# Patient Record
Sex: MALE | Race: WHITE | NOT HISPANIC OR LATINO | Employment: FULL TIME | URBAN - METROPOLITAN AREA
[De-identification: names, ages, dates, MRNs, and addresses within clinical notes are randomized per-mention and may not be internally consistent; named-entity substitution may affect disease eponyms.]

---

## 2022-10-07 ENCOUNTER — TELEPHONE (OUTPATIENT)
Dept: SCHEDULING | Facility: IMAGING CENTER | Age: 42
End: 2022-10-07

## 2022-10-14 ENCOUNTER — HOSPITAL ENCOUNTER (OUTPATIENT)
Dept: RADIOLOGY | Facility: MEDICAL CENTER | Age: 42
End: 2022-10-14
Attending: NURSE PRACTITIONER
Payer: COMMERCIAL

## 2022-10-14 ENCOUNTER — OFFICE VISIT (OUTPATIENT)
Dept: MEDICAL GROUP | Facility: MEDICAL CENTER | Age: 42
End: 2022-10-14
Payer: COMMERCIAL

## 2022-10-14 VITALS
WEIGHT: 152 LBS | HEIGHT: 72 IN | BODY MASS INDEX: 20.59 KG/M2 | TEMPERATURE: 96.9 F | OXYGEN SATURATION: 98 % | DIASTOLIC BLOOD PRESSURE: 60 MMHG | SYSTOLIC BLOOD PRESSURE: 100 MMHG | HEART RATE: 80 BPM

## 2022-10-14 DIAGNOSIS — S22.42XD CLOSED FRACTURE OF MULTIPLE RIBS OF LEFT SIDE WITH ROUTINE HEALING, SUBSEQUENT ENCOUNTER: ICD-10-CM

## 2022-10-14 DIAGNOSIS — R79.89 ELEVATED LFTS: ICD-10-CM

## 2022-10-14 DIAGNOSIS — D62 ACUTE BLOOD LOSS ANEMIA: ICD-10-CM

## 2022-10-14 DIAGNOSIS — Z76.89 ENCOUNTER TO ESTABLISH CARE: ICD-10-CM

## 2022-10-14 DIAGNOSIS — Z13.6 SCREENING FOR CARDIOVASCULAR CONDITION: ICD-10-CM

## 2022-10-14 DIAGNOSIS — F17.210 CIGARETTE NICOTINE DEPENDENCE WITHOUT COMPLICATION: ICD-10-CM

## 2022-10-14 DIAGNOSIS — Z13.29 SCREENING FOR THYROID DISORDER: ICD-10-CM

## 2022-10-14 DIAGNOSIS — S42.012D CLOSED ANTERIOR DISPLACED FRACTURE OF STERNAL END OF LEFT CLAVICLE WITH ROUTINE HEALING, SUBSEQUENT ENCOUNTER: ICD-10-CM

## 2022-10-14 DIAGNOSIS — Z87.828 HISTORY OF TRAUMA: ICD-10-CM

## 2022-10-14 PROBLEM — S32.009D CLOSED FRACTURE OF TRANSVERSE PROCESS OF LUMBAR VERTEBRA WITH ROUTINE HEALING: Status: ACTIVE | Noted: 2022-09-25

## 2022-10-14 PROBLEM — S36.039A SPLENIC LACERATION: Status: ACTIVE | Noted: 2022-09-23

## 2022-10-14 PROBLEM — S42.002A FRACTURE OF UNSPECIFIED PART OF LEFT CLAVICLE, INITIAL ENCOUNTER FOR CLOSED FRACTURE: Status: ACTIVE | Noted: 2022-09-25

## 2022-10-14 PROBLEM — J94.2 HEMOPNEUMOTHORAX ON LEFT: Status: ACTIVE | Noted: 2022-09-25

## 2022-10-14 PROBLEM — S22.42XA FRACTURE OF MULTIPLE RIBS OF LEFT SIDE: Status: ACTIVE | Noted: 2022-09-25

## 2022-10-14 PROBLEM — S22.008D CLOSED FRACTURE OF SPINOUS PROCESS OF THORACIC VERTEBRA WITH ROUTINE HEALING: Status: ACTIVE | Noted: 2022-09-25

## 2022-10-14 PROBLEM — S12.600D CLOSED DISPLACED FRACTURE OF SEVENTH CERVICAL VERTEBRA WITH ROUTINE HEALING: Status: ACTIVE | Noted: 2022-09-25

## 2022-10-14 PROBLEM — S37.032A KIDNEY LACERATION, LEFT: Status: ACTIVE | Noted: 2022-09-25

## 2022-10-14 PROCEDURE — 71046 X-RAY EXAM CHEST 2 VIEWS: CPT

## 2022-10-14 PROCEDURE — 99204 OFFICE O/P NEW MOD 45 MIN: CPT | Performed by: NURSE PRACTITIONER

## 2022-10-14 RX ORDER — METHOCARBAMOL 500 MG/1
TABLET, FILM COATED ORAL
COMMUNITY
Start: 2022-09-26 | End: 2022-10-14

## 2022-10-14 RX ORDER — CYCLOBENZAPRINE HCL 5 MG
5 TABLET ORAL 3 TIMES DAILY PRN
Qty: 90 TABLET | Refills: 0 | Status: SHIPPED | OUTPATIENT
Start: 2022-10-14

## 2022-10-14 RX ORDER — NALOXONE HYDROCHLORIDE 4 MG/.1ML
SPRAY NASAL
COMMUNITY
Start: 2022-09-26 | End: 2022-10-14

## 2022-10-14 RX ORDER — OXYCODONE HYDROCHLORIDE 5 MG/1
TABLET ORAL
COMMUNITY
Start: 2022-09-26 | End: 2022-10-14

## 2022-10-14 RX ORDER — NALOXONE HYDROCHLORIDE 4 MG/.1ML
1 SPRAY NASAL
COMMUNITY
Start: 2022-09-26 | End: 2022-10-14

## 2022-10-14 RX ORDER — METHOCARBAMOL 500 MG/1
500 TABLET, FILM COATED ORAL
COMMUNITY
Start: 2022-09-26 | End: 2022-10-14

## 2022-10-14 RX ORDER — OXYCODONE HYDROCHLORIDE 5 MG/1
5 TABLET ORAL
COMMUNITY
Start: 2022-09-26 | End: 2022-10-14

## 2022-10-14 ASSESSMENT — PATIENT HEALTH QUESTIONNAIRE - PHQ9: CLINICAL INTERPRETATION OF PHQ2 SCORE: 0
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
## 2022-10-18 ENCOUNTER — HOSPITAL ENCOUNTER (OUTPATIENT)
Dept: LAB | Facility: MEDICAL CENTER | Age: 42
End: 2022-10-18
Attending: NURSE PRACTITIONER
Payer: COMMERCIAL

## 2022-10-18 DIAGNOSIS — R79.89 ELEVATED LFTS: ICD-10-CM

## 2022-10-18 DIAGNOSIS — Z13.6 SCREENING FOR CARDIOVASCULAR CONDITION: ICD-10-CM

## 2022-10-18 DIAGNOSIS — D62 ACUTE BLOOD LOSS ANEMIA: ICD-10-CM

## 2022-10-18 DIAGNOSIS — Z13.29 SCREENING FOR THYROID DISORDER: ICD-10-CM

## 2022-10-18 LAB
ALBUMIN SERPL BCP-MCNC: 4.8 G/DL (ref 3.2–4.9)
ALBUMIN/GLOB SERPL: 1.7 G/DL
ALP SERPL-CCNC: 132 U/L (ref 30–99)
ALT SERPL-CCNC: 16 U/L (ref 2–50)
ANION GAP SERPL CALC-SCNC: 10 MMOL/L (ref 7–16)
AST SERPL-CCNC: 17 U/L (ref 12–45)
BILIRUB SERPL-MCNC: 0.5 MG/DL (ref 0.1–1.5)
BUN SERPL-MCNC: 19 MG/DL (ref 8–22)
CALCIUM SERPL-MCNC: 10.4 MG/DL (ref 8.5–10.5)
CHLORIDE SERPL-SCNC: 103 MMOL/L (ref 96–112)
CHOLEST SERPL-MCNC: 163 MG/DL (ref 100–199)
CO2 SERPL-SCNC: 27 MMOL/L (ref 20–33)
CREAT SERPL-MCNC: 0.96 MG/DL (ref 0.5–1.4)
ERYTHROCYTE [DISTWIDTH] IN BLOOD BY AUTOMATED COUNT: 49 FL (ref 35.9–50)
FASTING STATUS PATIENT QL REPORTED: NORMAL
GFR SERPLBLD CREATININE-BSD FMLA CKD-EPI: 102 ML/MIN/1.73 M 2
GLOBULIN SER CALC-MCNC: 2.9 G/DL (ref 1.9–3.5)
GLUCOSE SERPL-MCNC: 89 MG/DL (ref 65–99)
HCT VFR BLD AUTO: 46.7 % (ref 42–52)
HDLC SERPL-MCNC: 62 MG/DL
HGB BLD-MCNC: 14.8 G/DL (ref 14–18)
LDLC SERPL CALC-MCNC: 79 MG/DL
MCH RBC QN AUTO: 30.6 PG (ref 27–33)
MCHC RBC AUTO-ENTMCNC: 31.7 G/DL (ref 33.7–35.3)
MCV RBC AUTO: 96.7 FL (ref 81.4–97.8)
PLATELET # BLD AUTO: 365 K/UL (ref 164–446)
PMV BLD AUTO: 10.5 FL (ref 9–12.9)
POTASSIUM SERPL-SCNC: 5 MMOL/L (ref 3.6–5.5)
PROT SERPL-MCNC: 7.7 G/DL (ref 6–8.2)
RBC # BLD AUTO: 4.83 M/UL (ref 4.7–6.1)
SODIUM SERPL-SCNC: 140 MMOL/L (ref 135–145)
TRIGL SERPL-MCNC: 108 MG/DL (ref 0–149)
TSH SERPL DL<=0.005 MIU/L-ACNC: 1.75 UIU/ML (ref 0.38–5.33)
WBC # BLD AUTO: 8 K/UL (ref 4.8–10.8)

## 2022-10-18 PROCEDURE — 80061 LIPID PANEL: CPT

## 2022-10-18 PROCEDURE — 36415 COLL VENOUS BLD VENIPUNCTURE: CPT

## 2022-10-18 PROCEDURE — 80053 COMPREHEN METABOLIC PANEL: CPT

## 2022-10-18 PROCEDURE — 84443 ASSAY THYROID STIM HORMONE: CPT

## 2022-10-18 PROCEDURE — 85027 COMPLETE CBC AUTOMATED: CPT
